# Patient Record
Sex: FEMALE | Race: WHITE | Employment: OTHER | URBAN - METROPOLITAN AREA
[De-identification: names, ages, dates, MRNs, and addresses within clinical notes are randomized per-mention and may not be internally consistent; named-entity substitution may affect disease eponyms.]

---

## 2023-11-15 RX ORDER — LEVOTHYROXINE SODIUM 112 UG/1
112 TABLET ORAL DAILY
COMMUNITY

## 2023-11-15 RX ORDER — ATORVASTATIN CALCIUM 10 MG/1
10 TABLET, FILM COATED ORAL
COMMUNITY

## 2023-11-15 RX ORDER — VALSARTAN 40 MG/1
40 TABLET ORAL DAILY
COMMUNITY

## 2023-11-15 RX ORDER — LORAZEPAM 0.5 MG/1
0.5 TABLET ORAL EVERY 6 HOURS PRN
COMMUNITY

## 2023-11-15 RX ORDER — OMEPRAZOLE 20 MG/1
20 CAPSULE, DELAYED RELEASE ORAL DAILY
COMMUNITY

## 2023-11-16 ENCOUNTER — ANESTHESIA EVENT (OUTPATIENT)
Dept: OPERATING ROOM | Age: 68
End: 2023-11-16
Payer: MEDICARE

## 2023-11-16 ENCOUNTER — HOSPITAL ENCOUNTER (OUTPATIENT)
Age: 68
Setting detail: OUTPATIENT SURGERY
Discharge: HOME | End: 2023-11-16
Attending: PODIATRIST | Admitting: PODIATRIST
Payer: MEDICARE

## 2023-11-16 ENCOUNTER — ANESTHESIA (OUTPATIENT)
Dept: OPERATING ROOM | Age: 68
End: 2023-11-16
Payer: MEDICARE

## 2023-11-16 VITALS
HEIGHT: 65 IN | BODY MASS INDEX: 36.82 KG/M2 | WEIGHT: 221 LBS | OXYGEN SATURATION: 97 % | SYSTOLIC BLOOD PRESSURE: 125 MMHG | HEART RATE: 66 BPM | TEMPERATURE: 97.7 F | DIASTOLIC BLOOD PRESSURE: 80 MMHG | RESPIRATION RATE: 13 BRPM

## 2023-11-16 DIAGNOSIS — M20.5X2 HALLUX LIMITUS OF LEFT FOOT: ICD-10-CM

## 2023-11-16 DIAGNOSIS — M20.42 OTHER HAMMER TOE(S) (ACQUIRED), LEFT FOOT: ICD-10-CM

## 2023-11-16 DIAGNOSIS — M92.70: ICD-10-CM

## 2023-11-16 DIAGNOSIS — G57.62 NEUROMA OF SECOND INTERSPACE OF LEFT FOOT: ICD-10-CM

## 2023-11-16 PROCEDURE — 88304 TISSUE EXAM BY PATHOLOGIST: CPT

## 2023-11-16 PROCEDURE — 6360000002 HC RX W HCPCS: Performed by: PODIATRIST

## 2023-11-16 PROCEDURE — C1713 ANCHOR/SCREW BN/BN,TIS/BN: HCPCS | Performed by: PODIATRIST

## 2023-11-16 PROCEDURE — 7100000011 HC PHASE II RECOVERY - ADDTL 15 MIN: Performed by: PODIATRIST

## 2023-11-16 PROCEDURE — 2580000003 HC RX 258: Performed by: PODIATRIST

## 2023-11-16 PROCEDURE — 2709999900 HC NON-CHARGEABLE SUPPLY: Performed by: PODIATRIST

## 2023-11-16 PROCEDURE — 6360000002 HC RX W HCPCS

## 2023-11-16 PROCEDURE — 7100000010 HC PHASE II RECOVERY - FIRST 15 MIN: Performed by: PODIATRIST

## 2023-11-16 PROCEDURE — 2500000003 HC RX 250 WO HCPCS

## 2023-11-16 PROCEDURE — 7100000001 HC PACU RECOVERY - ADDTL 15 MIN: Performed by: PODIATRIST

## 2023-11-16 PROCEDURE — 3700000000 HC ANESTHESIA ATTENDED CARE: Performed by: PODIATRIST

## 2023-11-16 PROCEDURE — 6370000000 HC RX 637 (ALT 250 FOR IP): Performed by: ANESTHESIOLOGY

## 2023-11-16 PROCEDURE — 2500000003 HC RX 250 WO HCPCS: Performed by: PODIATRIST

## 2023-11-16 PROCEDURE — 3600000014 HC SURGERY LEVEL 4 ADDTL 15MIN: Performed by: PODIATRIST

## 2023-11-16 PROCEDURE — 3700000001 HC ADD 15 MINUTES (ANESTHESIA): Performed by: PODIATRIST

## 2023-11-16 PROCEDURE — C1776 JOINT DEVICE (IMPLANTABLE): HCPCS | Performed by: PODIATRIST

## 2023-11-16 PROCEDURE — 2580000003 HC RX 258

## 2023-11-16 PROCEDURE — 3600000004 HC SURGERY LEVEL 4 BASE: Performed by: PODIATRIST

## 2023-11-16 PROCEDURE — 7100000000 HC PACU RECOVERY - FIRST 15 MIN: Performed by: PODIATRIST

## 2023-11-16 PROCEDURE — C1769 GUIDE WIRE: HCPCS | Performed by: PODIATRIST

## 2023-11-16 PROCEDURE — 2720000010 HC SURG SUPPLY STERILE: Performed by: PODIATRIST

## 2023-11-16 DEVICE — IMPLANTABLE DEVICE: Type: IMPLANTABLE DEVICE | Site: FOOT | Status: FUNCTIONAL

## 2023-11-16 DEVICE — COMPONENT TOE L18MM DIA9.5MM MT CE TAPR POST HEMICAP: Type: IMPLANTABLE DEVICE | Site: FOOT | Status: FUNCTIONAL

## 2023-11-16 DEVICE — SCREW KRULOCK 3.0 X 16: Type: IMPLANTABLE DEVICE | Site: FOOT | Status: FUNCTIONAL

## 2023-11-16 DEVICE — LP SCREW 3.0X16MM CORTICAL MTP TI: Type: IMPLANTABLE DEVICE | Site: FOOT | Status: FUNCTIONAL

## 2023-11-16 DEVICE — PLATE MTP MAX: Type: IMPLANTABLE DEVICE | Site: FOOT | Status: FUNCTIONAL

## 2023-11-16 RX ORDER — SODIUM CHLORIDE 9 MG/ML
INJECTION, SOLUTION INTRAVENOUS PRN
Status: DISCONTINUED | OUTPATIENT
Start: 2023-11-16 | End: 2023-11-16 | Stop reason: HOSPADM

## 2023-11-16 RX ORDER — DEXAMETHASONE SODIUM PHOSPHATE 4 MG/ML
INJECTION, SOLUTION INTRA-ARTICULAR; INTRALESIONAL; INTRAMUSCULAR; INTRAVENOUS; SOFT TISSUE PRN
Status: DISCONTINUED | OUTPATIENT
Start: 2023-11-16 | End: 2023-11-16 | Stop reason: SDUPTHER

## 2023-11-16 RX ORDER — LIDOCAINE HYDROCHLORIDE 10 MG/ML
0.5 INJECTION, SOLUTION EPIDURAL; INFILTRATION; INTRACAUDAL; PERINEURAL ONCE
Status: DISCONTINUED | OUTPATIENT
Start: 2023-11-16 | End: 2023-11-16 | Stop reason: HOSPADM

## 2023-11-16 RX ORDER — MIDAZOLAM HYDROCHLORIDE 1 MG/ML
INJECTION INTRAMUSCULAR; INTRAVENOUS PRN
Status: DISCONTINUED | OUTPATIENT
Start: 2023-11-16 | End: 2023-11-16 | Stop reason: SDUPTHER

## 2023-11-16 RX ORDER — HYDRALAZINE HYDROCHLORIDE 20 MG/ML
10 INJECTION INTRAMUSCULAR; INTRAVENOUS
Status: DISCONTINUED | OUTPATIENT
Start: 2023-11-16 | End: 2023-11-16 | Stop reason: HOSPADM

## 2023-11-16 RX ORDER — SODIUM CHLORIDE 9 MG/ML
INJECTION, SOLUTION INTRAVENOUS PRN
Status: CANCELLED | OUTPATIENT
Start: 2023-11-16

## 2023-11-16 RX ORDER — SODIUM CHLORIDE 0.9 % (FLUSH) 0.9 %
5-40 SYRINGE (ML) INJECTION PRN
Status: DISCONTINUED | OUTPATIENT
Start: 2023-11-16 | End: 2023-11-16 | Stop reason: HOSPADM

## 2023-11-16 RX ORDER — PROCHLORPERAZINE EDISYLATE 5 MG/ML
5 INJECTION INTRAMUSCULAR; INTRAVENOUS
Status: DISCONTINUED | OUTPATIENT
Start: 2023-11-16 | End: 2023-11-16 | Stop reason: HOSPADM

## 2023-11-16 RX ORDER — SODIUM CHLORIDE 0.9 % (FLUSH) 0.9 %
5-40 SYRINGE (ML) INJECTION PRN
Status: CANCELLED | OUTPATIENT
Start: 2023-11-16

## 2023-11-16 RX ORDER — LIDOCAINE HYDROCHLORIDE 10 MG/ML
1 INJECTION, SOLUTION EPIDURAL; INFILTRATION; INTRACAUDAL; PERINEURAL
Status: CANCELLED | OUTPATIENT
Start: 2023-11-16 | End: 2023-11-17

## 2023-11-16 RX ORDER — SODIUM CHLORIDE 0.9 % (FLUSH) 0.9 %
5-40 SYRINGE (ML) INJECTION EVERY 12 HOURS SCHEDULED
Status: CANCELLED | OUTPATIENT
Start: 2023-11-16

## 2023-11-16 RX ORDER — PROPOFOL 10 MG/ML
INJECTION, EMULSION INTRAVENOUS PRN
Status: DISCONTINUED | OUTPATIENT
Start: 2023-11-16 | End: 2023-11-16 | Stop reason: SDUPTHER

## 2023-11-16 RX ORDER — FENTANYL CITRATE 50 UG/ML
INJECTION, SOLUTION INTRAMUSCULAR; INTRAVENOUS PRN
Status: DISCONTINUED | OUTPATIENT
Start: 2023-11-16 | End: 2023-11-16 | Stop reason: SDUPTHER

## 2023-11-16 RX ORDER — ONDANSETRON 2 MG/ML
4 INJECTION INTRAMUSCULAR; INTRAVENOUS
Status: DISCONTINUED | OUTPATIENT
Start: 2023-11-16 | End: 2023-11-16 | Stop reason: HOSPADM

## 2023-11-16 RX ORDER — BUPIVACAINE HYDROCHLORIDE 5 MG/ML
INJECTION, SOLUTION EPIDURAL; INTRACAUDAL
Status: COMPLETED | OUTPATIENT
Start: 2023-11-16 | End: 2023-11-16

## 2023-11-16 RX ORDER — LIDOCAINE HYDROCHLORIDE 10 MG/ML
INJECTION, SOLUTION EPIDURAL; INFILTRATION; INTRACAUDAL; PERINEURAL
Status: COMPLETED | OUTPATIENT
Start: 2023-11-16 | End: 2023-11-16

## 2023-11-16 RX ORDER — OXYCODONE HYDROCHLORIDE 5 MG/1
5 TABLET ORAL
Status: COMPLETED | OUTPATIENT
Start: 2023-11-16 | End: 2023-11-16

## 2023-11-16 RX ORDER — LABETALOL HYDROCHLORIDE 5 MG/ML
10 INJECTION, SOLUTION INTRAVENOUS
Status: DISCONTINUED | OUTPATIENT
Start: 2023-11-16 | End: 2023-11-16 | Stop reason: HOSPADM

## 2023-11-16 RX ORDER — SCOLOPAMINE TRANSDERMAL SYSTEM 1 MG/1
1 PATCH, EXTENDED RELEASE TRANSDERMAL ONCE
Status: DISCONTINUED | OUTPATIENT
Start: 2023-11-16 | End: 2023-11-16 | Stop reason: HOSPADM

## 2023-11-16 RX ORDER — DIPHENHYDRAMINE HYDROCHLORIDE 50 MG/ML
INJECTION INTRAMUSCULAR; INTRAVENOUS PRN
Status: DISCONTINUED | OUTPATIENT
Start: 2023-11-16 | End: 2023-11-16 | Stop reason: SDUPTHER

## 2023-11-16 RX ORDER — FENTANYL CITRATE 50 UG/ML
25 INJECTION, SOLUTION INTRAMUSCULAR; INTRAVENOUS EVERY 5 MIN PRN
Status: DISCONTINUED | OUTPATIENT
Start: 2023-11-16 | End: 2023-11-16 | Stop reason: HOSPADM

## 2023-11-16 RX ORDER — ONDANSETRON 2 MG/ML
INJECTION INTRAMUSCULAR; INTRAVENOUS PRN
Status: DISCONTINUED | OUTPATIENT
Start: 2023-11-16 | End: 2023-11-16 | Stop reason: SDUPTHER

## 2023-11-16 RX ORDER — LIDOCAINE HYDROCHLORIDE 20 MG/ML
INJECTION, SOLUTION INFILTRATION; PERINEURAL PRN
Status: DISCONTINUED | OUTPATIENT
Start: 2023-11-16 | End: 2023-11-16 | Stop reason: SDUPTHER

## 2023-11-16 RX ORDER — SODIUM CHLORIDE, SODIUM LACTATE, POTASSIUM CHLORIDE, CALCIUM CHLORIDE 600; 310; 30; 20 MG/100ML; MG/100ML; MG/100ML; MG/100ML
INJECTION, SOLUTION INTRAVENOUS CONTINUOUS
Status: DISCONTINUED | OUTPATIENT
Start: 2023-11-16 | End: 2023-11-16 | Stop reason: HOSPADM

## 2023-11-16 RX ORDER — HYDROMORPHONE HYDROCHLORIDE 2 MG/ML
0.5 INJECTION, SOLUTION INTRAMUSCULAR; INTRAVENOUS; SUBCUTANEOUS EVERY 5 MIN PRN
Status: DISCONTINUED | OUTPATIENT
Start: 2023-11-16 | End: 2023-11-16 | Stop reason: HOSPADM

## 2023-11-16 RX ORDER — SODIUM CHLORIDE 0.9 % (FLUSH) 0.9 %
5-40 SYRINGE (ML) INJECTION EVERY 12 HOURS SCHEDULED
Status: DISCONTINUED | OUTPATIENT
Start: 2023-11-16 | End: 2023-11-16 | Stop reason: HOSPADM

## 2023-11-16 RX ADMIN — DEXMEDETOMIDINE HYDROCHLORIDE 5 MCG: 100 INJECTION, SOLUTION INTRAVENOUS at 12:14

## 2023-11-16 RX ADMIN — LIDOCAINE HYDROCHLORIDE 100 MG: 20 INJECTION, SOLUTION INFILTRATION; PERINEURAL at 11:04

## 2023-11-16 RX ADMIN — FENTANYL CITRATE 25 MCG: 50 INJECTION, SOLUTION INTRAMUSCULAR; INTRAVENOUS at 11:58

## 2023-11-16 RX ADMIN — PROPOFOL 50 MG: 10 INJECTION, EMULSION INTRAVENOUS at 11:17

## 2023-11-16 RX ADMIN — DEXMEDETOMIDINE HYDROCHLORIDE 5 MCG: 100 INJECTION, SOLUTION INTRAVENOUS at 11:42

## 2023-11-16 RX ADMIN — FENTANYL CITRATE 25 MCG: 50 INJECTION, SOLUTION INTRAMUSCULAR; INTRAVENOUS at 12:59

## 2023-11-16 RX ADMIN — FENTANYL CITRATE 25 MCG: 50 INJECTION, SOLUTION INTRAMUSCULAR; INTRAVENOUS at 12:25

## 2023-11-16 RX ADMIN — DEXMEDETOMIDINE HYDROCHLORIDE 5 MCG: 100 INJECTION, SOLUTION INTRAVENOUS at 11:55

## 2023-11-16 RX ADMIN — DIPHENHYDRAMINE HYDROCHLORIDE 12.5 MG: 50 INJECTION, SOLUTION INTRAMUSCULAR; INTRAVENOUS at 11:11

## 2023-11-16 RX ADMIN — PROPOFOL 50 MG: 10 INJECTION, EMULSION INTRAVENOUS at 11:06

## 2023-11-16 RX ADMIN — SODIUM CHLORIDE, POTASSIUM CHLORIDE, SODIUM LACTATE AND CALCIUM CHLORIDE: 600; 310; 30; 20 INJECTION, SOLUTION INTRAVENOUS at 09:43

## 2023-11-16 RX ADMIN — DEXAMETHASONE SODIUM PHOSPHATE 4 MG: 4 INJECTION, SOLUTION INTRAMUSCULAR; INTRAVENOUS at 11:45

## 2023-11-16 RX ADMIN — DEXMEDETOMIDINE HYDROCHLORIDE 5 MCG: 100 INJECTION, SOLUTION INTRAVENOUS at 12:36

## 2023-11-16 RX ADMIN — MIDAZOLAM 1 MG: 1 INJECTION INTRAMUSCULAR; INTRAVENOUS at 11:04

## 2023-11-16 RX ADMIN — PROPOFOL 50 MCG/KG/MIN: 10 INJECTION, EMULSION INTRAVENOUS at 11:08

## 2023-11-16 RX ADMIN — ONDANSETRON 4 MG: 2 INJECTION INTRAMUSCULAR; INTRAVENOUS at 13:13

## 2023-11-16 RX ADMIN — FENTANYL CITRATE 25 MCG: 50 INJECTION, SOLUTION INTRAMUSCULAR; INTRAVENOUS at 11:33

## 2023-11-16 RX ADMIN — PROPOFOL 50 MG: 10 INJECTION, EMULSION INTRAVENOUS at 11:31

## 2023-11-16 RX ADMIN — VANCOMYCIN HYDROCHLORIDE 1500 MG: 1.5 INJECTION, POWDER, LYOPHILIZED, FOR SOLUTION INTRAVENOUS at 09:44

## 2023-11-16 RX ADMIN — MIDAZOLAM 1 MG: 1 INJECTION INTRAMUSCULAR; INTRAVENOUS at 11:02

## 2023-11-16 RX ADMIN — OXYCODONE 5 MG: 5 TABLET ORAL at 14:12

## 2023-11-16 RX ADMIN — PROPOFOL 50 MG: 10 INJECTION, EMULSION INTRAVENOUS at 11:04

## 2023-11-16 ASSESSMENT — PAIN - FUNCTIONAL ASSESSMENT: PAIN_FUNCTIONAL_ASSESSMENT: 0-10

## 2023-11-16 ASSESSMENT — ENCOUNTER SYMPTOMS: SHORTNESS OF BREATH: 0

## 2023-11-16 ASSESSMENT — PAIN DESCRIPTION - LOCATION: LOCATION: ANKLE

## 2023-11-16 ASSESSMENT — PAIN DESCRIPTION - ORIENTATION: ORIENTATION: LEFT

## 2023-11-16 ASSESSMENT — PAIN SCALES - GENERAL: PAINLEVEL_OUTOF10: 4

## 2023-11-16 ASSESSMENT — PAIN DESCRIPTION - PAIN TYPE: TYPE: SURGICAL PAIN

## 2023-11-16 ASSESSMENT — PAIN DESCRIPTION - DESCRIPTORS: DESCRIPTORS: DISCOMFORT

## 2023-11-16 NOTE — PROGRESS NOTES
Pt arrived from OR to PACU bay 2. Report received from OR staff. Pt arousable to voice. Surgical incisions dressings in place to L foot. Pt on RA, NSR, and VSS. Will continue to monitor.

## 2023-11-16 NOTE — PROGRESS NOTES
Date of Surgery Update:  Bozena Pang was seen, history and physical examination reviewed, and patient examined by me today. There have been no significant clinical changes since the completion of the previous history and physical.    The risk, benefits, and alternatives of the proposed procedure have been explained to the patient (or appropriate guardian) and understanding verbalized. All questions answered. Patient wishes to proceed.     Electronically signed by: Reji Allred DPM,11/16/2023,10:35 AM

## 2023-11-16 NOTE — BRIEF OP NOTE
Brief Postoperative Note      Patient: Pancho Lee  YOB: 1955  MRN: [id number]    Date of Procedure: 11/16/2023    Preoperative diagnosis: 1. Hallux valgus/hallux limitus left foot  2. Neuroma second interspace left foot  3. Freiberg's infarction second metatarsal left foot  4. Hammertoes 2, 3, 4 left foot  5. Plantarflexed/elongated third metatarsal left foot  Post-Op Diagnosis: Same       Procedures: 1. First metatarsal phalangeal joint fusion left foot  2. Excision of neuroma second interspace left foot  3. Partial second metatarsal resection with implant at the second metatarsal head left foot  4,5. Proximal interphalangeal joint fusion second and third toes left foot  6. Flexor tenotomy fourth toe left foot  7. Third metatarsal osteotomy left foot  8. Application of posterior splint left lower extremity  9. Intraoperative fluoroscopy    Surgeon(s):  Radha Carpenter DPM    Assistant:  Resident: Chuyita Ham DPM    Anesthesia: Monitor Anesthesia Care    Estimated Blood Loss (mL): Minimal    Complications: None    Specimens:   ID Type Source Tests Collected by Time Destination   A : A. NEUROMA Tissue Tissue SURGICAL PATHOLOGY Radha Carpenter, [id number] 11/16/2023 1216        Implants:  Implant Name Type Inv.  Item Serial No.  Lot No. LRB No. Used Action   PLATE MTP MAX - [id number]  PLATE MTP MAX  [employer]  Left 1 Implanted   SCREW BONE L32MM DIA4MM STD FT ANK TI SELF DRL ST KIT FULL - [id number]  SCREW BONE L32MM DIA4MM STD FT ANK TI SELF DRL ST KIT FULL  [employer]  Left 1 Implanted   EMMANUEL KREULOCK SCREW TI 3.0X10 - [id number]  EMMANUEL KREULOCK SCREW TI 3.0X10  [employer]  Left 1 Implanted   EMMANUEL KREULOCK SCREW TI 3.0X12 - [id number]  EMMANUEL KREULOCK SCREW TI 3.0X12  [employer]  Left 2 Implanted   SCREW KRULOCK 3.0 X 16 - [id number]  SCREW KRULOCK 3.0 X 16  [employer]  Left 1 Implanted   LP SCREW 3.0X16MM CORTICAL MTP TI -

## 2023-11-16 NOTE — ANESTHESIA PRE PROCEDURE
Department of Anesthesiology  Preprocedure Note       Name:  Lamonte Lazaro   Age:  80 y.o.  :  1955                                          MRN:  [id number]         Date:  2023      Surgeon: Army Gurrola):  Tristan Boyer DPM    Procedure: Procedure(s):  LEFT FOOT FIRST METATARSAL PHALANGEAL JOINT FUSION, SECOND METATARSAL HEAD RESURFACE IMPLANT  EXCISION OF NEUROMA SECOND INTERSPACE, LEFT  LEFT FOOT SECOND, THIRD, FOURTH HAMMERTOE REPAIR    Medications prior to admission:   Prior to Admission medications    Medication Sig Start Date End Date Taking? Authorizing Provider   levothyroxine (SYNTHROID) 112 MCG tablet Take 1 tablet by mouth Daily   Yes Milton Souza MD   valsartan (DIOVAN) 40 MG tablet Take 1 tablet by mouth daily   Yes Milton Souza MD   omeprazole (PRILOSEC) 20 MG delayed release capsule Take 1 capsule by mouth daily After dinner   Yes ProviderMilton MD   atorvastatin (LIPITOR) 10 MG tablet Take 1 tablet by mouth Takes every other day   Yes ProviderMilton MD   LORazepam (ATIVAN) 0.5 MG tablet Take 1 tablet by mouth every 6 hours as needed for Anxiety. Yes Provider, MD Milton       Current medications:    Current Facility-Administered Medications   Medication Dose Route Frequency Provider Last Rate Last Admin    lactated ringers IV soln infusion   IntraVENous Continuous Tristan Boyer DPM 50 mL/hr at 23 0943 New Bag at 23 09    lidocaine PF 1 % injection 0.5 mL  0.5 mL IntraDERmal Once Tristan Boyer DPM        vancomycin [facility]) 1,500 mg in sodium chloride 0.9 % 250 mL IVPB ([id number])  1,500 mg IntraVENous Once Tristan Boyer .7 mL/hr at 23 0944 1,500 mg at 2344       Allergies: Allergies   Allergen Reactions    Ciprofloxacin      Leg cramps    Augmentin [Amoxicillin-Pot Clavulanate] Rash       Problem List:  There is no problem list on file for this patient.       Past

## 2023-11-16 NOTE — PROGRESS NOTES
Discharge instructions reviewed with patient and pts  Maritza Keith. All home medications have been reviewed, pt v/u. Discharge instructions signed. Pt discharged via wheelchair. Pt discharged with all belongings.  Maritza Ketih taking stable pt home. Pt has walker, knee scooter and crutches at home.

## 2023-11-16 NOTE — ANESTHESIA POSTPROCEDURE EVALUATION
Department of Anesthesiology  Postprocedure Note    Patient: Gerhardt Folks  MRN: [id number]  YOB: 1955  Date of evaluation: 11/16/2023      Procedure Summary       Date: 11/16/23 Room / Location: [address]    Anesthesia Start: 5864 Anesthesia Stop: 8247    Procedures:       LEFT FOOT FIRST METATARSAL PHALANGEAL JOINT FUSION, SECOND METATARSAL HEAD RESURFACE IMPLANT (Left)      EXCISION OF NEUROMA SECOND INTERSPACE, LEFT (Left: Foot)      LEFT FOOT SECOND, THIRD, FOURTH HAMMERTOE REPAIR (Left: Foot) Diagnosis:       Hallux limitus of left foot      Juvenile osteochondrosis of second metatarsal, unspecified laterality      Other hammer toe(s) (acquired), left foot      Neuroma of second interspace of left foot      (Hallux limitus of left foot [M20.5X2])      (Juvenile osteochondrosis of second metatarsal, unspecified laterality [M92.70])      (Other hammer toe(s) (acquired), left foot [M20.42])      (Neuroma of second interspace of left foot [G57.62])    Surgeons: Fabiola Cowden, DPM Responsible Provider: Emma Savage MD    Anesthesia Type: MAC ASA Status: 3            Anesthesia Type: MAC    Gio Phase I: Gio Score: 10    Gio Phase II:        Anesthesia Post Evaluation    Patient location during evaluation: PACU  Patient participation: complete - patient participated  Level of consciousness: awake  Airway patency: patent  Nausea & Vomiting: no vomiting  Complications: no  Cardiovascular status: hemodynamically stable  Respiratory status: acceptable  Hydration status: euvolemic  Multimodal analgesia pain management approach

## 2023-11-17 NOTE — OP NOTE
Operative Note      Patient: Arsah Hampton  YOB: 1955  MRN: [id number]    Date of Procedure: 11/16/2023    Preoperative diagnosis: 1. Hallux valgus/hallux limitus left foot  2. Neuroma second interspace left foot  3. Freiberg's infarction second metatarsal left foot  4. Hammertoes 2, 3, 4 left foot  5. Plantarflexed/elongated third metatarsal left foot    Post-Op Diagnosis: Same       Procedures: 1. First metatarsal phalangeal joint fusion left foot  2. Excision of neuroma second interspace left foot  3. Partial second metatarsal resection with implant at the second metatarsal head left foot  4,5. Proximal interphalangeal joint fusion second and third toes left foot  6. Flexor tenotomy fourth toe left foot  7. Third metatarsal osteotomy left foot  8. Application of posterior splint left lower extremity  9. Intraoperative fluoroscopy       Surgeon(s):  Viviane Taylor DPM    Assistant:   Resident: Martine Anderson DPM    Anesthesia: Monitor Anesthesia Care    Estimated Blood Loss (mL): Minimal    Complications: None    Specimens:   ID Type Source Tests Collected by Time Destination   A : A. NEUROMA Tissue Tissue SURGICAL PATHOLOGY FAVI[facility] 11/16/2023 1216        Implants:  Implant Name Type Inv.  Item Serial No.  Lot No. LRB No. Used Action   PLATE MTP MAX - [id number]  PLATE MTP MAX  [employer]  Left 1 Implanted   SCREW BONE L32MM DIA4MM STD FT ANK TI SELF DRL ST KIT FULL - [id number]  SCREW BONE L32MM DIA4MM STD FT ANK TI SELF DRL ST KIT FULL  [employer]  Left 1 Implanted   EMMANUEL KREULOCK SCREW TI 3.0X10 - [id number]  EMMANUEL KREULOCK SCREW TI 3.0X10  [employer]  Left 1 Implanted   EMMANUEL KREULOCK SCREW TI 3.0X12 - [id number]  EMMANUEL KREULOCK SCREW TI 3.0X12  [employer]  Left 2 Implanted   SCREW KRULOCK 3.0 X 16 - [id number]  SCREW KRULOCK 3.0 X 16  [employer]  Left 1 Implanted   LP SCREW 3.0X16MM CORTICAL MTP TI - [id number]

## 2025-02-19 ENCOUNTER — HOSPITAL ENCOUNTER (OUTPATIENT)
Dept: PHYSICAL THERAPY | Age: 70
Setting detail: THERAPIES SERIES
Discharge: HOME | End: 2025-02-19
Payer: MEDICARE

## 2025-02-19 DIAGNOSIS — M25.675 DECREASED ROM OF LEFT FOOT: Primary | ICD-10-CM

## 2025-02-19 DIAGNOSIS — G89.29 CHRONIC TOE PAIN, LEFT FOOT: ICD-10-CM

## 2025-02-19 DIAGNOSIS — R29.898 IMPAIRED STRENGTH OF LOWER EXTREMITY: ICD-10-CM

## 2025-02-19 DIAGNOSIS — M79.675 CHRONIC TOE PAIN, LEFT FOOT: ICD-10-CM

## 2025-02-19 PROCEDURE — 97162 PT EVAL MOD COMPLEX 30 MIN: CPT

## 2025-02-19 PROCEDURE — 97110 THERAPEUTIC EXERCISES: CPT

## 2025-02-19 PROCEDURE — 97140 MANUAL THERAPY 1/> REGIONS: CPT

## 2025-02-19 NOTE — PLAN OF CARE
[facility] - Outpatient Rehabilitation and Therapy: [address], [place] [address] office: [phone] fax: [phone]     Physical Therapy Initial Evaluation Certification      Dear Jessica Cheatham DPM ,    We had the pleasure of evaluating the following patient for physical therapy services at [facility].  A summary of our findings can be found in the initial assessment below.  This includes our plan of care.  If you have any questions or concerns regarding these findings, please do not hesitate to contact me at the office phone number listed above.  Thank you for the referral.     Physician Signature:_______________________________Date:__________________  By signing above (or electronic signature), therapist’s plan is approved by physician       Physical Therapy: TREATMENT/PROGRESS NOTE   Patient: Mitzi Smith (70 y.o. female)   Examination Date: 2025   :  1955 MRN: [id number]   Visit #:   Insurance Allowable Auth Needed   80/20% []Yes    [x]No    Insurance: Payor: MEDICARE / Plan: MEDICARE PART A AND B / Product Type: *No Product type* /   Insurance ID: [id number] - (Medicare)  Secondary Insurance (if applicable): [facility]   Treatment Diagnosis:     ICD-10-CM    1. Decreased ROM of left foot  M25.675       2. Impaired strength of lower extremity  R29.898       3. Chronic toe pain, left foot  M79.675     G89.29          Medical Diagnosis:  Other abnormalities of gait and mobility [R26.89]  Peritoneal adhesions (postprocedural) (postinfection) [K66.0]  Secondary osteoarthritis, left ankle and foot [M19.272]   Referring Physician: Jessica Cheatham DPM  PCP: No primary care provider on file.     Plan of care signed (Y/N): 25 cosign req in epic    Date of Patient follow up with Physician: 3/5/25     Plan of Care Report: EVAL today  POC update due: (10 visits /OR AUTH LIMITS, whichever is less)  3/21/2025

## 2025-02-20 ENCOUNTER — HOSPITAL ENCOUNTER (OUTPATIENT)
Dept: PHYSICAL THERAPY | Age: 70
Setting detail: THERAPIES SERIES
Discharge: HOME | End: 2025-02-20
Payer: MEDICARE

## 2025-02-20 PROCEDURE — 97110 THERAPEUTIC EXERCISES: CPT

## 2025-02-20 PROCEDURE — 97530 THERAPEUTIC ACTIVITIES: CPT

## 2025-02-20 PROCEDURE — 97140 MANUAL THERAPY 1/> REGIONS: CPT

## 2025-02-20 NOTE — FLOWSHEET NOTE
[facility] - Outpatient Rehabilitation and Therapy: [address], [place] [address] office: [phone] fax: [phone]           Physical Therapy: TREATMENT/PROGRESS NOTE   Patient: Mitzi Smith (70 y.o. female)   Examination Date: 2025   :  1955 MRN: [id number]   Visit #:   Insurance Allowable Auth Needed   80/20% []Yes    [x]No    Insurance: Payor: MEDICARE / Plan: MEDICARE PART A AND B / Product Type: *No Product type* /   Insurance ID: [id number] - (Medicare)  Secondary Insurance (if applicable): [facility]   Treatment Diagnosis:     ICD-10-CM    1. Decreased ROM of left foot  M25.675       2. Impaired strength of lower extremity  R29.898       3. Chronic toe pain, left foot  M79.675     G89.29          Medical Diagnosis:  Other abnormalities of gait and mobility [R26.89]  Peritoneal adhesions (postprocedural) (postinfection) [K66.0]  Secondary osteoarthritis, left ankle and foot [M19.272]   Referring Physician: Jessica Cheatham DPM  PCP: No primary care provider on file.     Plan of care signed (Y/N): 25 gio barrios in epic, also fazed 25 to podiatrist    Date of Patient follow up with Physician: 3/5/25     Plan of Care Report: NO  POC update due: (10 visits /OR AUTH LIMITS, whichever is less)  3/21/2025                                             Medical History:  Comorbidities:  Hypertension  Anxiety  COVID-19  Relevant Medical History: pt had foot surgery 23 and per pt it his hurt ever since with pain more than prior to surgery, hx DVT after surgery-pt wears compression sock to knee L foot daily.                                         Precautions/ Contra-indications:           Latex allergy:  NO  Pacemaker:    NO  Contraindications for Manipulation: None  Date of Surgery: L foot 23, per pt bunionectomy L great toe, neuroma near 2nd toe removed, implant in 2nd toe, metal in 1st toe due to necrosis, toes 2 and 3 with hammer

## 2025-02-26 ENCOUNTER — HOSPITAL ENCOUNTER (OUTPATIENT)
Dept: PHYSICAL THERAPY | Age: 70
Setting detail: THERAPIES SERIES
Discharge: HOME | End: 2025-02-26
Payer: MEDICARE

## 2025-02-26 PROCEDURE — 97110 THERAPEUTIC EXERCISES: CPT

## 2025-02-26 PROCEDURE — 97140 MANUAL THERAPY 1/> REGIONS: CPT

## 2025-02-26 NOTE — FLOWSHEET NOTE
[facility] - Outpatient Rehabilitation and Therapy: [address], [place] [address] office: [phone] fax: [phone]           Physical Therapy: TREATMENT/PROGRESS NOTE   Patient: Mitzi Smith (70 y.o. female)   Examination Date: 2025   :  1955 MRN: [id number]   Visit #: 3 /12  Insurance Allowable Auth Needed   80/20% []Yes    [x]No    Insurance: Payor: MEDICARE / Plan: MEDICARE PART A AND B / Product Type: *No Product type* /   Insurance ID: [id number] - (Medicare)  Secondary Insurance (if applicable): [facility]   Treatment Diagnosis:     ICD-10-CM    1. Decreased ROM of left foot  M25.675       2. Impaired strength of lower extremity  R29.898       3. Chronic toe pain, left foot  M79.675     G89.29          Medical Diagnosis:  Other abnormalities of gait and mobility [R26.89]  Peritoneal adhesions (postprocedural) (postinfection) [K66.0]  Secondary osteoarthritis, left ankle and foot [M19.272]   Referring Physician: Jessica Cheatham DPM  PCP: No primary care provider on file.     Plan of care signed (Y/N): 25 cosign req in epic-removed due to Dr has not signed, also faxed 25 and 25 to podiatrist    Date of Patient follow up with Physician: 3/5/25     Plan of Care Report: NO  POC update due: (10 visits /OR AUTH LIMITS, whichever is less)  3/21/2025                                             Medical History:  Comorbidities:  Hypertension  Anxiety  COVID-19  Relevant Medical History: pt had foot surgery 23 and per pt it his hurt ever since with pain more than prior to surgery, hx DVT after surgery-pt wears compression sock to knee L foot daily.                                         Precautions/ Contra-indications:           Latex allergy:  NO  Pacemaker:    NO  Contraindications for Manipulation: None  Date of Surgery: L foot 23, per pt bunionectomy L great toe, neuroma near 2nd toe removed, implant in 2nd toe, metal in 1st toe

## 2025-02-28 ENCOUNTER — HOSPITAL ENCOUNTER (OUTPATIENT)
Dept: PHYSICAL THERAPY | Age: 70
Setting detail: THERAPIES SERIES
Discharge: HOME | End: 2025-02-28
Payer: MEDICARE

## 2025-02-28 PROCEDURE — 97110 THERAPEUTIC EXERCISES: CPT

## 2025-02-28 PROCEDURE — 97140 MANUAL THERAPY 1/> REGIONS: CPT

## 2025-02-28 NOTE — FLOWSHEET NOTE
[facility] - Outpatient Rehabilitation and Therapy: [address], [place] [address] office: [phone] fax: [phone]           Physical Therapy: TREATMENT/PROGRESS NOTE   Patient: Mitzi Smith (70 y.o. female)   Examination Date: 2025   :  1955 MRN: [id number]   Visit #:   Insurance Allowable Auth Needed   80/20% []Yes    [x]No    Insurance: Payor: MEDICARE / Plan: MEDICARE PART A AND B / Product Type: *No Product type* /   Insurance ID: [id number] - (Medicare)  Secondary Insurance (if applicable): [facility]   Treatment Diagnosis:     ICD-10-CM    1. Decreased ROM of left foot  M25.675       2. Impaired strength of lower extremity  R29.898       3. Chronic toe pain, left foot  M79.675     G89.29          Medical Diagnosis:  Other abnormalities of gait and mobility [R26.89]  Peritoneal adhesions (postprocedural) (postinfection) [K66.0]  Secondary osteoarthritis, left ankle and foot [M19.272]   Referring Physician: Jessica Cheatham DPM  PCP: No primary care provider on file.     Plan of care signed (Y/N): 25 cosign req in epic-removed due to Dr has not signed, also faxed 25 and 25 to podiatrist    Date of Patient follow up with Physician: 3/5/25     Plan of Care Report: NO  POC update due: (10 visits /OR AUTH LIMITS, whichever is less)  3/21/2025                                             Medical History:  Comorbidities:  Hypertension  Anxiety  COVID-19  Relevant Medical History: pt had foot surgery 23 and per pt it his hurt ever since with pain more than prior to surgery, hx DVT after surgery-pt wears compression sock to knee L foot daily.                                         Precautions/ Contra-indications:           Latex allergy:  NO  Pacemaker:    NO  Contraindications for Manipulation: None  Date of Surgery: L foot 23, per pt bunionectomy L great toe, neuroma near 2nd toe removed, implant in 2nd toe, metal in 1st toe

## 2025-03-03 ENCOUNTER — HOSPITAL ENCOUNTER (OUTPATIENT)
Dept: PHYSICAL THERAPY | Age: 70
Setting detail: THERAPIES SERIES
Discharge: HOME | End: 2025-03-03
Payer: MEDICARE

## 2025-03-03 PROCEDURE — 97140 MANUAL THERAPY 1/> REGIONS: CPT

## 2025-03-03 NOTE — FLOWSHEET NOTE
[facility] - Outpatient Rehabilitation and Therapy: [address], [place] [address] office: [phone] fax: [phone]           Physical Therapy: TREATMENT/PROGRESS NOTE   Patient: Mitzi Smith (70 y.o. female)   Examination Date: 2025   :  1955 MRN: [id number]   Visit #:   Insurance Allowable Auth Needed   80/20% []Yes    [x]No    Insurance: Payor: MEDICARE / Plan: MEDICARE PART A AND B / Product Type: *No Product type* /   Insurance ID: [id number] - (Medicare)  Secondary Insurance (if applicable): [facility]   Treatment Diagnosis:     ICD-10-CM    1. Decreased ROM of left foot  M25.675       2. Impaired strength of lower extremity  R29.898       3. Chronic toe pain, left foot  M79.675     G89.29          Medical Diagnosis:  Other abnormalities of gait and mobility [R26.89]  Peritoneal adhesions (postprocedural) (postinfection) [K66.0]  Secondary osteoarthritis, left ankle and foot [M19.272]   Referring Physician: Jessica Cheatham DPM  PCP: No primary care provider on file.     Plan of care signed (Y/N): 25 cosign req in epic-removed due to Dr has not signed, also faxed 25 and 25 to podiatrist    Date of Patient follow up with Physician: 3/5/25     Plan of Care Report: NO  POC update due: (10 visits /OR AUTH LIMITS, whichever is less)  3/21/2025                                             Medical History:  Comorbidities:  Hypertension  Anxiety  COVID-19  Relevant Medical History: pt had foot surgery 23 and per pt it his hurt ever since with pain more than prior to surgery, hx DVT after surgery-pt wears compression sock to knee L foot daily.                                         Precautions/ Contra-indications:           Latex allergy:  NO  Pacemaker:    NO  Contraindications for Manipulation: None  Date of Surgery: L foot 23, per pt bunionectomy L great toe, neuroma near 2nd toe removed, implant in 2nd toe, metal in 1st toe

## 2025-03-06 ENCOUNTER — HOSPITAL ENCOUNTER (OUTPATIENT)
Dept: PHYSICAL THERAPY | Age: 70
Setting detail: THERAPIES SERIES
Discharge: HOME | End: 2025-03-06
Payer: MEDICARE

## 2025-03-06 PROCEDURE — 97530 THERAPEUTIC ACTIVITIES: CPT

## 2025-03-06 PROCEDURE — 97110 THERAPEUTIC EXERCISES: CPT

## 2025-03-06 PROCEDURE — 97140 MANUAL THERAPY 1/> REGIONS: CPT

## 2025-03-06 NOTE — FLOWSHEET NOTE
[facility] - Outpatient Rehabilitation and Therapy: [address], [place] [address] office: [phone] fax: [phone]           Physical Therapy: TREATMENT/PROGRESS NOTE   Patient: Mitzi Smith (70 y.o. female)   Examination Date: 2025   :  1955 MRN: [id number]   Visit #:   Insurance Allowable Auth Needed   80/20% []Yes    [x]No    Insurance: Payor: MEDICARE / Plan: MEDICARE PART A AND B / Product Type: *No Product type* /   Insurance ID: [id number] - (Medicare)  Secondary Insurance (if applicable): [facility]   Treatment Diagnosis:     ICD-10-CM    1. Decreased ROM of left foot  M25.675       2. Impaired strength of lower extremity  R29.898       3. Chronic toe pain, left foot  M79.675     G89.29          Medical Diagnosis:  Other abnormalities of gait and mobility [R26.89]  Peritoneal adhesions (postprocedural) (postinfection) [K66.0]  Secondary osteoarthritis, left ankle and foot [M19.272]   Referring Physician: Jessica Cheatham DPM  PCP: No primary care provider on file.     Plan of care signed (Y/N): 25 cosign req in epic-removed due to Dr has not signed, also faxed 25 and 25 to podiatrist (pt has signed POC but forgot and will drop off sometime soon).     Date of Patient follow up with Physician: 25      Plan of Care Report: NO  POC update due: (10 visits /OR AUTH LIMITS, whichever is less)  3/21/2025                                             Medical History:  Comorbidities:  Hypertension  Anxiety  COVID-19  Relevant Medical History: pt had foot surgery 23 and per pt it his hurt ever since with pain more than prior to surgery, hx DVT after surgery-pt wears compression sock to knee L foot daily.                                         Precautions/ Contra-indications:           Latex allergy:  NO  Pacemaker:    NO  Contraindications for Manipulation: None  Date of Surgery: L foot 23, per pt bunionectomy L great toe,

## 2025-03-10 ENCOUNTER — APPOINTMENT (OUTPATIENT)
Dept: PHYSICAL THERAPY | Age: 70
End: 2025-03-10
Payer: MEDICARE

## (undated) DEVICE — ZIMMER® STERILE DISPOSABLE TOURNIQUET CUFF WITH PLC, DUAL PORT, SINGLE BLADDER, 18 IN. (46 CM)

## (undated) DEVICE — PADDING CAST W4INXL4YD ST COT RAYON MICROPLEATED HIGHLY

## (undated) DEVICE — IMPLANTABLE DEVICE: Type: IMPLANTABLE DEVICE | Site: FOOT | Status: NON-FUNCTIONAL

## (undated) DEVICE — Device

## (undated) DEVICE — T-DRAPE,EXTREMITY,STERILE: Brand: MEDLINE

## (undated) DEVICE — GUIDEWIRE ORTH DIA0.045IN W/ TRCR TIP LSR LN

## (undated) DEVICE — BANDAGE COMPR W4INXL5YD BGE HI E W/ REM CLP SURE-WRAP

## (undated) DEVICE — CURITY NON-ADHERENT STRIPS: Brand: CURITY

## (undated) DEVICE — SYRINGE MED 10ML LUERLOCK TIP W/O SFTY DISP

## (undated) DEVICE — REAMER SURG DIA20MM PHLANG FOR FOREFOOT FUS

## (undated) DEVICE — NITI G-WIRE TROCAR .062X5.91

## (undated) DEVICE — BIT DRL DIA1.7MM ADD ON FOR LO PROF PLT SCR SYS

## (undated) DEVICE — BANDAGE,GAUZE,CONFORMING,4"X75",STRL,LF: Brand: MEDLINE

## (undated) DEVICE — BANDAGE,GAUZE,BULKEE II,4.5"X4.1YD,STRL: Brand: MEDLINE

## (undated) DEVICE — PRECISION THIN (5.5 X 0.38 X 18.0MM)

## (undated) DEVICE — PACK PROCEDURE SURG EXTREMITY MFFOP CUST

## (undated) DEVICE — MICRO GRASPING FORCEPS, 20CM,LEFT: Brand: N.A.

## (undated) DEVICE — SUTURE VCRL + SZ 5 0 L18IN ABSRB UD PS 2 L19MM PRIM REV CUT VCP495G

## (undated) DEVICE — BB-TAK MTP THREADED

## (undated) DEVICE — MASC TURNOVER KIT: Brand: MEDLINE INDUSTRIES, INC.

## (undated) DEVICE — 3M™ STERI-STRIP™ REINFORCED ADHESIVE SKIN CLOSURES, R1547, 1/2 IN X 4 IN (12 MM X 100 MM), 6 STRIPS/ENVELOPE: Brand: 3M™ STERI-STRIP™

## (undated) DEVICE — 3M™ COBAN™ NL STERILE NON-LATEX SELF-ADHERENT WRAP, 2084S, 4 IN X 5 YD (10 CM X 4,5 M), 18 ROLLS/CASE: Brand: 3M™ COBAN™

## (undated) DEVICE — SYRINGE, LUER LOCK, 10ML: Brand: MEDLINE

## (undated) DEVICE — HYPODERMIC SAFETY NEEDLE: Brand: MAGELLAN

## (undated) DEVICE — PRECISION THIN (9.0 X 0.38 X 31.0MM)

## (undated) DEVICE — SOLUTION IRRIG 500ML 0.9% SOD CHLO USP POUR PLAS BTL

## (undated) DEVICE — REAMER SURG DIA20MM METATARSAL FOR FOREFOOT FUS

## (undated) DEVICE — TOWEL,OR,DSP,ST,BLUE,STD,4/PK,20PK/CS: Brand: MEDLINE

## (undated) DEVICE — SOLUTION IV SODIUM CHL 0.9% 500 ML

## (undated) DEVICE — APPLICATOR MEDICATED 26 CC SOLUTION HI LT ORNG CHLORAPREP

## (undated) DEVICE — SUTURE VCRL + SZ 3-0 L18IN ABSRB UD SH 1/2 CIR TAPERCUT NDL VCP864D

## (undated) DEVICE — DRESSING PETRO W3XL3IN OIL EMUL N ADH GZ KNIT IMPREG CELOS

## (undated) DEVICE — GAUZE,SPONGE,4"X4",8PLY,STRL,LF,10/TRAY: Brand: MEDLINE

## (undated) DEVICE — BIT DRL DIA32MM STR CANN FOR STD COMPR FULL THRD SCR

## (undated) DEVICE — STRIP,CLOSURE,WOUND,MEDI-STRIP,1/2X4: Brand: MEDLINE

## (undated) DEVICE — MEDICINE CUP, GRADUATED, STER: Brand: MEDLINE

## (undated) DEVICE — GLOVE ORTHO 7 1/2   MSG9475

## (undated) DEVICE — SUTURE PROL SZ 4-0 L18IN NONABSORBABLE BLU L19MM FS-2 3/8 8683G

## (undated) DEVICE — BANDAGE COMPR W4INXL12FT E DISP ESMARCH EVEN

## (undated) DEVICE — INTENDED FOR TISSUE SEPARATION, AND OTHER PROCEDURES THAT REQUIRE A SHARP SURGICAL BLADE TO PUNCTURE OR CUT.: Brand: BARD-PARKER ® STAINLESS STEEL BLADES

## (undated) DEVICE — C-ARM: Brand: UNBRANDED